# Patient Record
Sex: FEMALE | URBAN - METROPOLITAN AREA
[De-identification: names, ages, dates, MRNs, and addresses within clinical notes are randomized per-mention and may not be internally consistent; named-entity substitution may affect disease eponyms.]

---

## 2020-07-08 ENCOUNTER — NURSE ONLY (OUTPATIENT)
Dept: PRIMARY CARE CLINIC | Age: 20
End: 2020-07-08

## 2020-07-08 PROCEDURE — 99211 OFF/OP EST MAY X REQ PHY/QHP: CPT | Performed by: NURSE PRACTITIONER

## 2020-07-08 NOTE — PROGRESS NOTES
Tessa Antonio received a viral test for COVID-19. They were educated on isolation and quarantine as appropriate. For any symptoms, they were directed to seek care from their PCP, given contact information to establish with a doctor, directed to an urgent care or the emergency room. Swab and go Covid testing for preop.

## 2020-07-10 NOTE — PRE-PROCEDURE INSTRUCTIONS
Called patient about procedure. Told to be here at 1130 for procedure at 1300. NPO after midnight, but can take morning medication with sips of water, patient stated they are on lovenox blood thinners and were told by the office to hold it the day of the procedure. To have a responsible adult be with patient take them home and stay with them afterwards, if they do not get admitted to [address] [place] [address]. And if available bring current list of medications. No other questions or concerns.

## 2020-07-11 LAB
SARS-COV-2: NOT DETECTED
SOURCE: NORMAL

## 2020-07-13 ENCOUNTER — HOSPITAL ENCOUNTER (OUTPATIENT)
Dept: INTERVENTIONAL RADIOLOGY/VASCULAR | Age: 20
Discharge: HOME | End: 2020-07-13
Payer: COMMERCIAL

## 2020-07-13 VITALS — WEIGHT: 110 LBS | BODY MASS INDEX: 20.24 KG/M2 | HEIGHT: 62 IN | TEMPERATURE: 97.4 F

## 2020-07-13 LAB
ANION GAP SERPL CALCULATED.3IONS-SCNC: 9 MMOL/L (ref 3–16)
BUN BLDV-MCNC: 10 MG/DL (ref 7–20)
CALCIUM SERPL-MCNC: 10 MG/DL (ref 8.3–10.6)
CHLORIDE BLD-SCNC: 104 MMOL/L (ref 99–110)
CO2: 24 MMOL/L (ref 21–32)
CREAT SERPL-MCNC: <0.5 MG/DL (ref 0.6–1.1)
GFR AFRICAN AMERICAN: >60
GFR NON-AFRICAN AMERICAN: >60
GLUCOSE BLD-MCNC: 100 MG/DL (ref 70–99)
HCG QUALITATIVE: NEGATIVE
HCT VFR BLD CALC: 40.1 % (ref 36–48)
HEMOGLOBIN: 13.4 G/DL (ref 12–16)
INR BLD: 1.08 (ref 0.86–1.14)
MCH RBC QN AUTO: 29.5 PG (ref 26–34)
MCHC RBC AUTO-ENTMCNC: 33.5 G/DL (ref 31–36)
MCV RBC AUTO: 88 FL (ref 80–100)
PDW BLD-RTO: 14.8 % (ref 12.4–15.4)
PLATELET # BLD: 469 K/UL (ref 135–450)
PMV BLD AUTO: 8.4 FL (ref 5–10.5)
POTASSIUM SERPL-SCNC: 4.6 MMOL/L (ref 3.5–5.1)
PROTHROMBIN TIME: 12.5 SEC (ref 10–13.2)
RBC # BLD: 4.55 M/UL (ref 4–5.2)
SODIUM BLD-SCNC: 137 MMOL/L (ref 136–145)
WBC # BLD: 7.8 K/UL (ref 4–11)

## 2020-07-13 PROCEDURE — 50389 REMOVE RENAL TUBE W/FLUORO: CPT | Performed by: RADIOLOGY

## 2020-07-13 PROCEDURE — 85610 PROTHROMBIN TIME: CPT

## 2020-07-13 PROCEDURE — 80048 BASIC METABOLIC PNL TOTAL CA: CPT

## 2020-07-13 PROCEDURE — 84703 CHORIONIC GONADOTROPIN ASSAY: CPT

## 2020-07-13 PROCEDURE — 85027 COMPLETE CBC AUTOMATED: CPT

## 2020-07-13 RX ORDER — HYDROCODONE BITARTRATE AND ACETAMINOPHEN 5; 325 MG/1; MG/1
1 TABLET ORAL
COMMUNITY
Start: 2020-07-02

## 2020-07-13 ASSESSMENT — PAIN SCALES - GENERAL: PAINLEVEL_OUTOF10: 5

## 2020-07-13 ASSESSMENT — PAIN DESCRIPTION - LOCATION: LOCATION: LEG

## 2020-07-13 ASSESSMENT — PAIN DESCRIPTION - PAIN TYPE: TYPE: ACUTE PAIN

## 2020-07-13 ASSESSMENT — PAIN DESCRIPTION - ORIENTATION: ORIENTATION: RIGHT;LEFT

## 2020-07-13 NOTE — H&P
Patient:  Toribio Shellie   :   2000      Relevant patient history reviewed and discussed. The procedure including risks and benefits was discussed at length with the patient (or designated family member) and all questions were answered. Informed consent to proceed with the procedure was given. Condition : stable    [facility] nurses notes reviewed and agreed. Medications reviewed. Allergies:    Allergies   Allergen Reactions    Cephalosporins Hives     cephatin    Codeine Hives    Penicillins Hives     When patient was young     Plan: nephrostogram